# Patient Record
Sex: FEMALE | Race: WHITE | NOT HISPANIC OR LATINO | Employment: FULL TIME | URBAN - NONMETROPOLITAN AREA
[De-identification: names, ages, dates, MRNs, and addresses within clinical notes are randomized per-mention and may not be internally consistent; named-entity substitution may affect disease eponyms.]

---

## 2021-01-18 ENCOUNTER — IMMUNIZATION (OUTPATIENT)
Dept: VACCINE CLINIC | Facility: HOSPITAL | Age: 61
End: 2021-01-18

## 2021-01-18 PROCEDURE — 0001A: CPT | Performed by: FAMILY MEDICINE

## 2021-01-18 PROCEDURE — 91300 HC SARSCOV02 VAC 30MCG/0.3ML IM: CPT | Performed by: FAMILY MEDICINE

## 2021-02-08 ENCOUNTER — IMMUNIZATION (OUTPATIENT)
Dept: VACCINE CLINIC | Facility: HOSPITAL | Age: 61
End: 2021-02-08

## 2021-02-08 PROCEDURE — 0002A: CPT | Performed by: INTERNAL MEDICINE

## 2021-02-08 PROCEDURE — 91300 HC SARSCOV02 VAC 30MCG/0.3ML IM: CPT | Performed by: INTERNAL MEDICINE

## 2021-09-09 ENCOUNTER — OFFICE VISIT (OUTPATIENT)
Dept: NEUROSURGERY | Facility: CLINIC | Age: 61
End: 2021-09-09

## 2021-09-09 VITALS
OXYGEN SATURATION: 99 % | HEIGHT: 60 IN | BODY MASS INDEX: 41.11 KG/M2 | WEIGHT: 209.4 LBS | TEMPERATURE: 97.3 F | HEART RATE: 75 BPM

## 2021-09-09 DIAGNOSIS — M47.22 OSTEOARTHRITIS OF SPINE WITH RADICULOPATHY, CERVICAL REGION: ICD-10-CM

## 2021-09-09 DIAGNOSIS — M50.30 DDD (DEGENERATIVE DISC DISEASE), CERVICAL: ICD-10-CM

## 2021-09-09 DIAGNOSIS — M47.812 CERVICAL SPONDYLOSIS WITHOUT MYELOPATHY: Primary | ICD-10-CM

## 2021-09-09 PROCEDURE — 99204 OFFICE O/P NEW MOD 45 MIN: CPT | Performed by: PHYSICIAN ASSISTANT

## 2021-09-09 RX ORDER — CHOLECALCIFEROL (VITAMIN D3) 1250 MCG
50000 CAPSULE ORAL WEEKLY
COMMUNITY
Start: 2021-09-02

## 2021-09-09 RX ORDER — GABAPENTIN 300 MG/1
300 CAPSULE ORAL 3 TIMES DAILY
Qty: 30 CAPSULE | Refills: 1 | Status: SHIPPED | OUTPATIENT
Start: 2021-09-09 | End: 2021-09-30

## 2021-09-09 RX ORDER — TRAMADOL HYDROCHLORIDE 50 MG/1
50 TABLET ORAL 2 TIMES DAILY PRN
COMMUNITY
Start: 2021-08-25

## 2021-09-09 RX ORDER — MELOXICAM 15 MG/1
15 TABLET ORAL DAILY PRN
COMMUNITY
Start: 2021-09-02

## 2021-09-09 NOTE — PROGRESS NOTES
Patient: Yesica Bronson  : 1960  Chart #: [id number]    Date of Service: 2021    Chief Complaint   Patient presents with   • Neck Pain   • Tingling and pain in LLE, left-sided       HPI  Very pleasant 60 yo, right-handed, WF with 5 weeks of neck pain radiating into the left shoulder down the arm with associated numbness and tingling in the left arm and all of the fingers and thumb of the left hand.  She has no precipitating events, no injuries or falls, the only difference has been a new memory mattress and pillow.  She has found that if she tilts her head to the right that she gets resolution of the numbness and tingling in her hand and arm.  She has not noticed any weakness in her  or arm strength.  She has received steroid shots from her PCP with minimal improvement.  She is on her second anti-inflammatory medication, she has not been to physical therapy.    Chronic Illnesses:  Arthritis  Obesity  Past Medical History:   Diagnosis Date   • Anemia    • Arthritis    • Asthma    • Osteopetrosis    • Parathyroid adenoma    • Parathyroid disease (CMS/[facility])        No Known Allergies      Current Outpatient Medications:   •  Cholecalciferol (Vitamin D3) 1.25 MG (53055 UT) capsule, Take 50,000 Units by mouth 1 (One) Time Per Week., Disp: , Rfl:   •  meloxicam (MOBIC) 15 MG tablet, Take 15 mg by mouth Daily As Needed. for pain, Disp: , Rfl:   •  ProAir  (90 Base) MCG/ACT inhaler, As Needed., Disp: , Rfl:   •  traMADol (ULTRAM) 50 MG tablet, Take 50 mg by mouth 2 (Two) Times a Day As Needed. for pain, Disp: , Rfl:     Social History     Socioeconomic History   • Marital status: Single     Spouse name: Not on file   • Number of children: Not on file   • Years of education: Not on file   • Highest education level: Not on file   Tobacco Use   • Smoking status: Never Smoker   • Smokeless tobacco: Never Used   Vaping Use   • Vaping Use: Never used   Substance and Sexual Activity   • Alcohol use: Never   •  Drug use: Never   • Sexual activity: Defer       Family History   Problem Relation Age of Onset   • Heart disease Mother    • Lupus Sister    • Rheum arthritis Sister    • Arthritis Brother    • Arthritis Sister        Review of Systems   Constitutional: Negative for activity change, appetite change, chills, diaphoresis, fatigue, fever and unexpected weight change.   HENT: Negative for congestion, dental problem, drooling, ear discharge, ear pain, facial swelling, hearing loss, mouth sores, nosebleeds, postnasal drip, rhinorrhea, sinus pressure, sinus pain, sneezing, sore throat, tinnitus, trouble swallowing and voice change.    Eyes: Negative for photophobia, pain, discharge, redness, itching and visual disturbance.   Respiratory: Negative for apnea, cough, choking, chest tightness, shortness of breath, wheezing and stridor.    Cardiovascular: Negative for chest pain, palpitations and leg swelling.   Gastrointestinal: Negative for abdominal distention, abdominal pain, anal bleeding, blood in stool, constipation, diarrhea, nausea, rectal pain and vomiting.   Endocrine: Negative for cold intolerance, heat intolerance, polydipsia, polyphagia and polyuria.   Genitourinary: Negative for decreased urine volume, difficulty urinating, dysuria, enuresis, flank pain, frequency, genital sores, hematuria and urgency.   Musculoskeletal: Positive for back pain, neck pain and neck stiffness. Negative for arthralgias, gait problem, joint swelling and myalgias.   Skin: Negative for color change, pallor, rash and wound.   Allergic/Immunologic: Negative for environmental allergies, food allergies and immunocompromised state.   Neurological: Positive for numbness. Negative for dizziness, tremors, seizures, syncope, facial asymmetry, speech difficulty, weakness, light-headedness and headaches.   Hematological: Negative for adenopathy. Does not bruise/bleed easily.   Psychiatric/Behavioral: Negative for agitation, behavioral problems,  "confusion, decreased concentration, dysphoric mood, hallucinations, self-injury, sleep disturbance and suicidal ideas. The patient is not nervous/anxious and is not hyperactive.    All other systems reviewed and are negative.      Patient's Body mass index is 40.9 kg/m². indicating that she is morbidly obese (BMI > 40 or > 35 with obesity - related health condition). Obesity-related health conditions include the following: osteoarthritis. Obesity is unchanged. BMI is is above average; BMI management plan is completed.     Social History    Tobacco Use      Smoking status: Never Smoker      Smokeless tobacco: Never Used       Physical examination:  Pulse 75, temperature 97.3 °F (36.3 °C), height 152.4 cm (60\"), weight 95 kg (209 lb 6.4 oz), SpO2 99 %.  HEENT- normocephalic, atraumatic, sclera clear  Lungs-normal expansion, no wheezing  Heart-regular rate and rhythm  Extremities-positive pulses, no edema    Neurologic Exam    WDWN WF  A/A/C, speech clear, attention normal, conversant, answers questions appropriately, good historian.  Cranial nerves II through XII are intact  Motor examination does not reveal weakness in the , upper or lower extremities.   Sensation is intact.  Gait is normal, balance is normal.   No tremors are noted.  Reflexes are intact.   Soriano is negative. Clonus is negative.   Palpation of the left shoulder and arm are mildly tender.    Radiographic Imaging:  I have personally reviewed imaging and outside results. I have independently interpreted the imaging and discussed with the patient the findings and appropriate management.  MRI of the cervical spine shows degenerative changes at C3-4 with left foraminal narrowing,  Changes at C6-7 with bilateral foraminal narrowing    Medical Decision Making  Assessment and Plan:  1. Cervical spondylosis C3-4, C5-6, C6-7.  2. Neck and left arm pain.  3. Numbness, tingling     I have recommended PT and low Neurontin in an attempt to improve her neck " and left arm pain. We will have a telemed visit next week to discuss her response to medication. We will follow with her response to PT and make further recommendations for treatment.     Genesis Andino PA-C         Patient Care Team:  Berto Duron MD as PCP - General (General Surgery)

## 2021-09-17 ENCOUNTER — TELEMEDICINE (OUTPATIENT)
Dept: NEUROSURGERY | Facility: CLINIC | Age: 61
End: 2021-09-17

## 2021-09-17 DIAGNOSIS — M50.20 HNP (HERNIATED NUCLEUS PULPOSUS), CERVICAL: Primary | ICD-10-CM

## 2021-09-17 PROCEDURE — 99214 OFFICE O/P EST MOD 30 MIN: CPT | Performed by: PHYSICIAN ASSISTANT

## 2021-09-17 NOTE — PROGRESS NOTES
Yesica Bronson   1960   [id number]     09/17/2021     You have chosen to receive care through a telehealth visit.  Do you consent to use a video/audio connection for your medical care today? Yes    HPI   This is a 61-year-old female with neck pain radiating into the left arm with numbness and tingling into the fingers and thumb on the left.  Prior MRI of the cervical spine showed degenerative changes at C3-4 with left foraminal narrowing and changes at C6-7 with bilateral foraminal narrowing.  The patient describes a burning pain in the arms.  The patient was started on Neurontin at her last visit and she reports that she has had a good response.    Chronic Illnesses:  Osteoarthritis  Past Medical History:  No date: Anemia  No date: Arthritis  No date: Asthma  No date: Osteopetrosis  No date: Parathyroid adenoma  No date: Parathyroid disease (CMS/[facility])     Past Surgical History:   Procedure Laterality Date   • HYSTERECTOMY  2000   • PARATHYROID GLAND SURGERY  2001    Partial removal         No Known Allergies       Current Outpatient Medications:   •  Cholecalciferol (Vitamin D3) 1.25 MG (26078 UT) capsule, Take 50,000 Units by mouth 1 (One) Time Per Week., Disp: , Rfl:   •  gabapentin (NEURONTIN) 300 MG capsule, Take 1 capsule by mouth 3 (Three) Times a Day. One capsule at hs for 3-4 nights then 2 at hs for 3-4 nights., Disp: 30 capsule, Rfl: 1  •  meloxicam (MOBIC) 15 MG tablet, Take 15 mg by mouth Daily As Needed. for pain, Disp: , Rfl:   •  ProAir  (90 Base) MCG/ACT inhaler, As Needed., Disp: , Rfl:   •  traMADol (ULTRAM) 50 MG tablet, Take 50 mg by mouth 2 (Two) Times a Day As Needed. for pain, Disp: , Rfl:      Social History     Socioeconomic History   • Marital status: Single     Spouse name: Not on file   • Number of children: Not on file   • Years of education: Not on file   • Highest education level: Not on file   Tobacco Use   • Smoking status: Never Smoker   • Smokeless tobacco: Never Used    Vaping Use   • Vaping Use: Never used   Substance and Sexual Activity   • Alcohol use: Never   • Drug use: Never   • Sexual activity: Defer        family history includes Arthritis in her brother and sister; Heart disease in her mother; Lupus in her sister; Rheum arthritis in her sister.     Social History    Tobacco Use      Smoking status: Never Smoker      Smokeless tobacco: Never Used       There is no height or weight on file to calculate BMI.   Patient's There is no height or weight on file to calculate BMI. indicating that she is morbidly obese (BMI > 40 or > 35 with obesity - related health condition). Obesity-related health conditions include the following: osteoarthritis. Obesity is unchanged. BMI is is above average; BMI management plan is completed.   There were no vitals taken for this visit.     Radiological Data Review:    Cervical MRI shows multilevel degenerative disc disease worsening spondylosis at C3-4, C5-6 and C6-7 with canal narrowing.    Assessment and Plan:  1 cervical spondylosis  2 neuropathic pain-currently on Neurontin 300 mg, she will try 600 mg.  3 she will attend physical therapy next week and we will have a follow-up telemedicine visit to see how she is doing with medications and therapy.    Genesis Andino, PAC      PCP:  Berto Duron MD

## 2021-09-30 ENCOUNTER — TELEMEDICINE (OUTPATIENT)
Dept: NEUROSURGERY | Facility: CLINIC | Age: 61
End: 2021-09-30

## 2021-09-30 DIAGNOSIS — M50.30 DDD (DEGENERATIVE DISC DISEASE), CERVICAL: ICD-10-CM

## 2021-09-30 DIAGNOSIS — M47.812 CERVICAL SPONDYLOSIS WITHOUT MYELOPATHY: ICD-10-CM

## 2021-09-30 DIAGNOSIS — M47.22 OSTEOARTHRITIS OF SPINE WITH RADICULOPATHY, CERVICAL REGION: Primary | ICD-10-CM

## 2021-09-30 DIAGNOSIS — M50.20 HNP (HERNIATED NUCLEUS PULPOSUS), CERVICAL: ICD-10-CM

## 2021-09-30 PROCEDURE — 99214 OFFICE O/P EST MOD 30 MIN: CPT | Performed by: PHYSICIAN ASSISTANT

## 2021-09-30 RX ORDER — GABAPENTIN 300 MG/1
300 CAPSULE ORAL NIGHTLY
Qty: 60 CAPSULE | Refills: 1 | Status: SHIPPED | OUTPATIENT
Start: 2021-09-30 | End: 2021-10-21 | Stop reason: SDUPTHER

## 2021-09-30 NOTE — PROGRESS NOTES
Patient: Yesica Bronson  : 1960  Chart #: [id number]    Date of Service: 2021    CC: Neck and left shoulder pain    HPI  Very pleasant 62 yo, right-handed, WF with 5 weeks of neck pain radiating into the left shoulder down the arm with associated numbness and tingling in the left arm and all of the fingers and thumb of the left hand.  She has no precipitating events, no injuries or falls, the only difference has been a new memory mattress and pillow.  She has found that if she tilts her head to the right that she gets resolution of the numbness and tingling in her hand and arm.  She has not noticed any weakness in her  or arm strength.  She has received steroid shots from her PCP with minimal improvement.  She is on her second anti-inflammatory medication, she is currently in physical therapy and reports that her symptoms have improved.  She is also on Neurontin 300 mg twice in the evening with improvement in her symptoms.    Chronic Illnesses:  Arthritis  Obesity  Past Medical History:   Diagnosis Date   • Anemia    • Arthritis    • Asthma    • Osteopetrosis    • Parathyroid adenoma    • Parathyroid disease (CMS/[facility])        No Known Allergies      Current Outpatient Medications:   •  Cholecalciferol (Vitamin D3) 1.25 MG (62113 UT) capsule, Take 50,000 Units by mouth 1 (One) Time Per Week., Disp: , Rfl:   •  gabapentin (NEURONTIN) 300 MG capsule, Take 1 capsule by mouth Every Night. Take 2 capsules in the evening, Disp: 60 capsule, Rfl: 1  •  meloxicam (MOBIC) 15 MG tablet, Take 15 mg by mouth Daily As Needed. for pain, Disp: , Rfl:   •  ProAir  (90 Base) MCG/ACT inhaler, As Needed., Disp: , Rfl:   •  traMADol (ULTRAM) 50 MG tablet, Take 50 mg by mouth 2 (Two) Times a Day As Needed. for pain, Disp: , Rfl:     Social History     Socioeconomic History   • Marital status: Single     Spouse name: Not on file   • Number of children: Not on file   • Years of education: Not on file   • Highest  education level: Not on file   Tobacco Use   • Smoking status: Never Smoker   • Smokeless tobacco: Never Used   Vaping Use   • Vaping Use: Never used   Substance and Sexual Activity   • Alcohol use: Never   • Drug use: Never   • Sexual activity: Defer       Family History   Problem Relation Age of Onset   • Heart disease Mother    • Lupus Sister    • Rheum arthritis Sister    • Arthritis Brother    • Arthritis Sister        Review of Systems   Constitutional: Negative for activity change, appetite change, chills, diaphoresis, fatigue, fever and unexpected weight change.   HENT: Negative for congestion, dental problem, drooling, ear discharge, ear pain, facial swelling, hearing loss, mouth sores, nosebleeds, postnasal drip, rhinorrhea, sinus pressure, sinus pain, sneezing, sore throat, tinnitus, trouble swallowing and voice change.    Eyes: Negative for photophobia, pain, discharge, redness, itching and visual disturbance.   Respiratory: Negative for apnea, cough, choking, chest tightness, shortness of breath, wheezing and stridor.    Cardiovascular: Negative for chest pain, palpitations and leg swelling.   Gastrointestinal: Negative for abdominal distention, abdominal pain, anal bleeding, blood in stool, constipation, diarrhea, nausea, rectal pain and vomiting.   Endocrine: Negative for cold intolerance, heat intolerance, polydipsia, polyphagia and polyuria.   Genitourinary: Negative for decreased urine volume, difficulty urinating, dysuria, enuresis, flank pain, frequency, genital sores, hematuria and urgency.   Musculoskeletal: Positive for back pain, neck pain and neck stiffness. Negative for arthralgias, gait problem, joint swelling and myalgias.   Skin: Negative for color change, pallor, rash and wound.   Allergic/Immunologic: Negative for environmental allergies, food allergies and immunocompromised state.   Neurological: Positive for numbness. Negative for dizziness, tremors, seizures, syncope, facial  asymmetry, speech difficulty, weakness, light-headedness and headaches.   Hematological: Negative for adenopathy. Does not bruise/bleed easily.   Psychiatric/Behavioral: Negative for agitation, behavioral problems, confusion, decreased concentration, dysphoric mood, hallucinations, self-injury, sleep disturbance and suicidal ideas. The patient is not nervous/anxious and is not hyperactive.    All other systems reviewed and are negative.      Patient's There is no height or weight on file to calculate BMI. indicating that she is morbidly obese (BMI > 40 or > 35 with obesity - related health condition). Obesity-related health conditions include the following: osteoarthritis. Obesity is unchanged. BMI is is above average; BMI management plan is completed.     Social History    Tobacco Use      Smoking status: Never Smoker      Smokeless tobacco: Never Used       Physical examination:  There were no vitals taken for this visit.  HEENT- normocephalic, atraumatic, sclera clear  Lungs-normal expansion, no wheezing  Heart-regular rate and rhythm  Extremities-positive pulses, no edema    Neurologic Exam    WDWN WF  A/A/C, speech clear, attention normal, conversant, answers questions appropriately, good historian.  Cranial nerves II through XII are intact  Motor examination does not reveal weakness in the , upper or lower extremities.   Sensation is intact.  Gait is normal, balance is normal.   No tremors are noted.  Reflexes are intact.   Soriano is negative. Clonus is negative.   Palpation of the left shoulder and arm are mildly tender.    Radiographic Imaging:  I have personally reviewed imaging and outside results. I have independently interpreted the imaging and discussed with the patient the findings and appropriate management.  MRI of the cervical spine shows degenerative changes at C3-4 with left foraminal narrowing,  Changes at C6-7 with bilateral foraminal narrowing    Medical Decision Making  Assessment and  Plan:  1. Cervical spondylosis C3-4, C5-6, C6-7.  2. Neck and left shoulder pain has improved with physical therapy and medications.  She would like to continue conservative treatment with physical therapy medications and time prior to any surgical intervention.  3. Numbness, tingling has improved with Neurontin  4.  Telemedicine visit in 2 weeks, she will call me if her symptoms change.      Genesis Anidno PA-C       Patient Care Team:  Berto Duron MD as PCP - General (General Surgery)  Marco Mora as Referring Physician (Nurse Practitioner)

## 2021-10-20 DIAGNOSIS — M50.20 HNP (HERNIATED NUCLEUS PULPOSUS), CERVICAL: ICD-10-CM

## 2021-10-20 DIAGNOSIS — M47.22 OSTEOARTHRITIS OF SPINE WITH RADICULOPATHY, CERVICAL REGION: ICD-10-CM

## 2021-10-20 DIAGNOSIS — M47.812 CERVICAL SPONDYLOSIS WITHOUT MYELOPATHY: Primary | ICD-10-CM

## 2021-10-20 DIAGNOSIS — M50.30 DDD (DEGENERATIVE DISC DISEASE), CERVICAL: ICD-10-CM

## 2021-10-20 NOTE — TELEPHONE ENCOUNTER
Pallavi could you put in the order and I will sign for her Neurontin and then, Erin can you cancel her telemedicine visit.

## 2021-10-20 NOTE — TELEPHONE ENCOUNTER
"Provider:  Sina  Caller: Patient  Time of call:   4:27  Phone #:  [phone]  Surgery:  NA  Surgery Date: NA   Last visit:   09/09/2021  Next visit: TBD        Reason for call:         Patient is inquiring about telephone visit and Gabapentin. Please see encounter from Trish.     Last OV: 09/09/2021  Last video visit: 09/30/2021  Telemedicine with Genesis Andino PA-C (09/30/2021)    Medical Decision Making  Assessment and Plan:  1. Cervical spondylosis C3-4, C5-6, C6-7.  2. Neck and left shoulder pain has improved with physical therapy and medications.  She would like to continue conservative treatment with physical therapy medications and time prior to any surgical intervention.  3. Numbness, tingling has improved with Neurontin  4.  Telemedicine visit in 2 weeks, she will call me if her symptoms change.\"\"  "

## 2021-10-20 NOTE — TELEPHONE ENCOUNTER
THIS PT HAD VIDEOVISIT ON 9/30 AND STATES AT TIME VIR[place]IA TOLD HER TO INCREASE HER NEURONTIN PER DAY. PATIENT STATES SHE IS FEELING MUCH BETTER NOW BUT WILL NEED A REFILL VERY SOON. PER 9/30 NOTE, VIRGINIA SAID VIDEOVISIT IN 2WKS. PATIENT WONDERS IF THIS VISIT IS NECESSARY SINCE SHE FEELS GOOD.

## 2021-10-21 NOTE — TELEPHONE ENCOUNTER
Fabi,  neurontin is pended, but unsure what you want to increase her dose to.  Please complete rx order before signing.

## 2021-10-22 RX ORDER — GABAPENTIN 300 MG/1
300 CAPSULE ORAL 3 TIMES DAILY
Qty: 90 CAPSULE | Refills: 1 | Status: SHIPPED | OUTPATIENT
Start: 2021-10-22 | End: 2022-02-22 | Stop reason: SDUPTHER

## 2021-10-22 NOTE — TELEPHONE ENCOUNTER
I have spoke with the patient, she is taking 1 in the morning and 2 in the evening.  I am calling in a prescription for 1, 3 times a day, I have discussed with the patient and she knows how to take 3 times a day

## 2021-10-25 NOTE — TELEPHONE ENCOUNTER
MS MARLEY HAS CALLED THIS MORNING STATING THAT ASA COULD NOT FIGURE  OUT IF THIS REFILL WAS FOR 3 PER DAY OR 5 PER DAY. PLEASE ADVISE.

## 2021-11-08 ENCOUNTER — TELEPHONE (OUTPATIENT)
Dept: NEUROSURGERY | Facility: CLINIC | Age: 61
End: 2021-11-08

## 2021-11-08 NOTE — TELEPHONE ENCOUNTER
Tingling in right arm and hand. She is attributing that to extra activities with PT.   She is working on adjusting her laptop.   She went back to PT and feels better tonight. She continues with some in the left arm that she is working with in PT.  She plan to continue with PT thru the end of the month.    Genesis Andino PA-C

## 2021-11-08 NOTE — TELEPHONE ENCOUNTER
Caller:  ORIANA MARLEY    Relationship: SELF    Best call back number: [phone]    What was the call regarding: PT CALLED AND STATED SHE NEEDED TO SPEAK WITH VIR[place]IA GARIBAY.  PT STATS SHE IS EXPERIENCING NEW SYMPTOMS.  PT STATES SHE HAS STARTED TO EXPERIENCE NUMBNESS AND TINGLING IN RIGHT ARM.  PT STATES THESE SYMPTOMS STARTED ON Friday NIGHT AFTER SHE HAD COMPLETED PHYSICAL THERAPY.  PT DENIES LOSS OF BOWEL OR BLADDER.  PT STATES THE SYMPTOMS ARE INTERMITTENT.  PT STATES RIGHT ARM ISSUES ARE NEW SHE WAS ALREADY EXPERIENCING THEM IN LEFT ARM.  PT HAS THERAPY SCHEDULED FOR TODAY BUT WOULD LIKE TO KNOW IF SHE SHOULD TAKE A BREAK    Do you require a callback:     PLEASE CALL PT  THANK YOU

## 2021-11-08 NOTE — TELEPHONE ENCOUNTER
Provider:  Sina  Surgery:  SHALOM  Surgery Date:    Last visit:   Office Visit with Genesis Andino PA-C (09/09/2021)    Next visit: NA    Reason for call:     Pt states PT has created new symptoms in her RUE (problem was only in LUE prior to PT).

## 2022-02-22 DIAGNOSIS — M47.22 OSTEOARTHRITIS OF SPINE WITH RADICULOPATHY, CERVICAL REGION: ICD-10-CM

## 2022-02-22 DIAGNOSIS — M47.812 CERVICAL SPONDYLOSIS WITHOUT MYELOPATHY: ICD-10-CM

## 2022-02-22 DIAGNOSIS — M50.20 HNP (HERNIATED NUCLEUS PULPOSUS), CERVICAL: Primary | ICD-10-CM

## 2022-02-22 DIAGNOSIS — M50.30 DDD (DEGENERATIVE DISC DISEASE), CERVICAL: ICD-10-CM

## 2022-02-22 RX ORDER — GABAPENTIN 300 MG/1
300 CAPSULE ORAL 3 TIMES DAILY
Qty: 90 CAPSULE | Refills: 1 | Status: SHIPPED | OUTPATIENT
Start: 2022-02-22 | End: 2022-08-23 | Stop reason: SDUPTHER

## 2022-02-22 NOTE — TELEPHONE ENCOUNTER
Provider:  Fabi FISH  Caller: patieint  Time of call:   11:03  Phone #:  [phone]  Surgery:    Surgery Date:    Last visit:   09/09/21  Next visit:     RISHABH:     11/20/2021 Gabapentin 300MG 1960 60 20 Genesis Andino [facility] 1  11/20/2021 Tramadol Hcl  50MG/50MG/50MG/50MG/50MG/  1960 60 30 [place], [facility] 10 1  12/12/2021 Gabapentin 300MG 1960 90 30 Genesis Andino [facility] 1  12/18/2021 Tramadol Hcl  50MG/50MG/50MG/50MG/50MG/  1960 60 30 [place] [facility] 10 1  01/15/2022 Gabapentin 300MG 1960 30 10 Genesis Andino [facility] 1  01/19/2022 Tramadol Hcl  50MG/50MG/50MG/50MG/50MG/  1960 [age] [place] [facility] 10 1    Reason for call:     Patient called to request refill on Gabapentin.

## 2022-08-23 DIAGNOSIS — M47.812 CERVICAL SPONDYLOSIS WITHOUT MYELOPATHY: ICD-10-CM

## 2022-08-23 DIAGNOSIS — M50.30 DDD (DEGENERATIVE DISC DISEASE), CERVICAL: ICD-10-CM

## 2022-08-23 DIAGNOSIS — M50.20 HNP (HERNIATED NUCLEUS PULPOSUS), CERVICAL: ICD-10-CM

## 2022-08-23 DIAGNOSIS — M47.22 OSTEOARTHRITIS OF SPINE WITH RADICULOPATHY, CERVICAL REGION: ICD-10-CM

## 2022-08-23 RX ORDER — GABAPENTIN 300 MG/1
300 CAPSULE ORAL 3 TIMES DAILY
Qty: 90 CAPSULE | Refills: 1 | Status: SHIPPED | OUTPATIENT
Start: 2022-08-23 | End: 2022-12-29 | Stop reason: SDUPTHER

## 2022-08-23 NOTE — TELEPHONE ENCOUNTER
Provider:  Fabi Andino  Surgery/Procedure:  ---  Surgery/Procedure Date: ---   Last visit:   Telemedicine with Genesis Andino PA-C (09/30/2021)    Next visit: ---     Reason for call:     Patient called in to the clinical line requesting a refill on her Gabapentin.     Last refilled on 4/18/22. She only takes her prescription as needed. Whether its one capsule per day, two per day, or three per day. Just depending on her symptoms.     She states she only has 3 left and would like the refill because it helps her when she 'over does it'    I did advise patient she may need an updated FU appointment, patient was understanding and would like a televisit if it is needed.     Pended med for review, confirmed pharmacy. [facility].     02/22/2022 Gabapentin 300MG 1960 90 18 Genesis Andino [facility] 1  03/20/2022 Tramadol Hcl 50MG 1960 60 30 [facility] 10 1  04/18/2022 Gabapentin 300MG 1960 90 18 Genesis Andino [facility] 1  04/24/2022 Tramadol Hcl 50MG 1960 60 30 [place], [facility] 10 1  05/26/2022 Tramadol Hcl 50MG 1960 60 30 [facility] 10 1  06/24/2022 Tramadol Hcl 50MG 1960 60 30 [place], [facility] 10 1  07/26/2022 Tramadol Hcl 50MG 1960 60 30 [place], [facility] 10 1    Requested Prescriptions     Pending Prescriptions Disp Refills   • gabapentin (NEURONTIN) 300 MG capsule 90 capsule 1     Sig: Take 1 capsule by mouth 3 (Three) Times a Day. Take 2 capsules in the evening

## 2022-09-14 ENCOUNTER — TELEMEDICINE (OUTPATIENT)
Dept: NEUROSURGERY | Facility: CLINIC | Age: 62
End: 2022-09-14

## 2022-09-14 DIAGNOSIS — M47.812 CERVICAL SPONDYLOSIS WITHOUT MYELOPATHY: Primary | ICD-10-CM

## 2022-09-14 PROCEDURE — 99443 PR PHYS/QHP TELEPHONE EVALUATION 21-30 MIN: CPT | Performed by: PHYSICIAN ASSISTANT

## 2022-09-14 NOTE — PROGRESS NOTES
Yesica Bronson   1960   [id number]     09/14/2022     Telemedicine: OZIEL Cervantes  Location of Provider: Office  Type of Service: consult via telemedicine  Any physical exam was assisted by the patient.  All communications with the patient (verbal, audiovisual and written) were documented in the patient's medical record per documentation standards.  Mode of transmission: Telemedicine via 2-way interactive A/V telecommunication.  Basis for telemedicine: COVID-19    You have chosen to receive care through a telephone visit. Do you consent to use a telephone visit for your medical care today? yes    CC:left arm pain with tingling into the 2,3,4th digits.    HPI:  62 yo, right-handed, WF with 5 weeks of neck pain radiating into the left shoulder down the arm with associated numbness and tingling in the left arm and all of the fingers and thumb of the left hand.  She has no precipitating events, no injuries or falls, the only difference has been a new memory mattress and pillow.  She has found that if she tilts her head to the right that she gets resolution of the numbness and tingling in her hand and arm.  She has not noticed any weakness in her  or arm strength.  She has received steroid shots from her PCP with minimal improvement.  She is on her second anti-inflammatory medication, she is currently in physical therapy and reports that her symptoms have improved.  She is also on Neurontin 300 mg twice in the evening with improvement in her symptoms.    Past medical history, allergies, medications, surgical history, social history, family history reviewed and updated.    Social History     Tobacco Use   Smoking Status Never Smoker   Smokeless Tobacco Never Used        There is no height or weight on file to calculate BMI.       Physical examination:  The patient sounds well on the phone, no cough, SOB or wheezing is noted.    Review of studies were completed.      Assessment/Plan:  Diagnoses and all orders for  this visit:    1. Cervical spondylosis without myelopathy (Primary)  -     EMG left arm; Future  -     Nerve Conduction Test Left Arm; Future    With ongoing symptoms we have discussed proceeding with nerve studies and she is in agreement. We will f/u to review.     This was an audio and video enabled telemedicine encounter. This visit has been rescheduled as a phone visit to comply with patient safety concerns in accordance with CDC recommendations. Total time of discussion was 15 minutes, total time of visit 30 minutes.       OZIEL Baig Jerry L, MD

## 2022-09-15 ENCOUNTER — TELEPHONE (OUTPATIENT)
Dept: NEUROSURGERY | Facility: CLINIC | Age: 62
End: 2022-09-15

## 2022-09-15 DIAGNOSIS — M47.812 CERVICAL SPONDYLOSIS WITHOUT MYELOPATHY: Primary | ICD-10-CM

## 2022-09-15 NOTE — TELEPHONE ENCOUNTER
Provider:  Sina  Surgery/Procedure:  SHALOM  Surgery/Procedure Date: NA   Last visit:   Telemedicine with Genesis Andino PA-C (09/14/2022)  Next visit: NA     Reason for call:     ----- Message from La Nena Ott sent at 9/15/2022 11:05 AM EDT -----  Please correct the order for the EMG/ NCV to only one referral.  Thank you .     I have pended a EMG/NCV test for the left arm. I think the original order needs to be canceled to schedule this one.

## 2022-09-16 NOTE — TELEPHONE ENCOUNTER
I have faxed referral to Dr. Parker's office.  Patient is aware and will let us know when she is scheduled

## 2022-10-25 ENCOUNTER — TELEPHONE (OUTPATIENT)
Dept: NEUROSURGERY | Facility: CLINIC | Age: 62
End: 2022-10-25

## 2022-10-25 NOTE — TELEPHONE ENCOUNTER
Caller: Yesica Bronson    Relationship to patient: Self    Best call back number: [phone]    Chief complaint: TELE-VISIT    Type of visit: FOLLOW UP     Requested date: 11/01    If rescheduling, when is the original appointment: N/A    Additional notes:PATIENT CALLED AND WOULD LIKE TO SWITCH OFFICE VISIT FOR TELE-VISIT INSTEAD WITH VIRGINIA GARIBAY.  PLEASE CALL PATIENT AND CONFIRM. THANK YOU!

## 2022-10-25 NOTE — TELEPHONE ENCOUNTER
Head, normocephalic, atraumatic, Face, Face within normal limits, Ears, External ears within normal limits Spoke to patient. Appointment changed to MyChart video visit (insurance does not cover televisit).

## 2022-11-01 ENCOUNTER — TELEMEDICINE (OUTPATIENT)
Dept: NEUROSURGERY | Facility: CLINIC | Age: 62
End: 2022-11-01

## 2022-11-01 DIAGNOSIS — M47.22 OSTEOARTHRITIS OF SPINE WITH RADICULOPATHY, CERVICAL REGION: ICD-10-CM

## 2022-11-01 DIAGNOSIS — M50.20 HNP (HERNIATED NUCLEUS PULPOSUS), CERVICAL: ICD-10-CM

## 2022-11-01 DIAGNOSIS — M47.812 CERVICAL SPONDYLOSIS WITHOUT MYELOPATHY: ICD-10-CM

## 2022-11-01 DIAGNOSIS — M50.30 DDD (DEGENERATIVE DISC DISEASE), CERVICAL: Primary | ICD-10-CM

## 2022-11-01 DIAGNOSIS — M50.123 CERVICAL DISC DISORDER AT C6-C7 LEVEL WITH RADICULOPATHY: ICD-10-CM

## 2022-11-01 PROCEDURE — 99214 OFFICE O/P EST MOD 30 MIN: CPT | Performed by: PHYSICIAN ASSISTANT

## 2022-11-01 NOTE — PROGRESS NOTES
Yesica SORENSEN Bronson   1960   [id number]     11/01/2022     Telemedicine: OZIEL Cervantes  Location of Provider: Office  Type of Service: consult via telemedicine  Any physical exam was assisted by the patient.  All communications with the patient (verbal, audiovisual and written) were documented in the patient's medical record per documentation standards.  Mode of transmission: Telemedicine via 2-way interactive A/V telecommunication.  Basis for telemedicine: COVID-19    You have chosen to receive care through a telemedicine visit. Do you consent to use a video visit for your medical care today? yes    CC: Neck and arm pain    HPI:  This is a 62-year-old female with neck and arm pain with diagnostic studies That have revealed degenerative changes at C3-4, C5-6 and C6-7 with bilateral foraminal narrowing.  We are having a video visit today to review her EMG and nerve conduction studies.    Past medical history, allergies, medications, surgical history, social history, family history reviewed and updated.    Social History     Tobacco Use   Smoking Status Never   Smokeless Tobacco Never        There is no height or weight on file to calculate BMI.       Physical examination:  The patient sounds well on the phone, no cough, SOB or wheezing is noted.    Review of new studies:  Nerve studies reveal a chronic C7 radiculopathy.    Assessment/Plan:  Diagnoses and all orders for this visit:    1. DDD (degenerative disc disease), cervical (Primary)    2. Cervical spondylosis without myelopathy    3. Osteoarthritis of spine with radiculopathy, cervical region    4. HNP (herniated nucleus pulposus), cervical    5. Cervical disc disorder at C6-C7 level with radiculopathy    We lost video connection and completed her visit with a telephone visit.  We have discussed her use of medication which is tramadol which does provide her significant improvement in her symptoms.  Her primary care will only give her a twice daily dose and she  has taken 3 and sometimes 4/day for pain control.  She does not have weakness in her  or arm, with her last exacerbation she had significant pain and now with improved pain and she does have residual tingling.  Our plan for now will be to continue with her therapy, continue tramadol for pain control and have a follow-up appointment with Dr. Blanchard in the [facility] to further discuss best treatment plans.      This was an audio and video enabled telemedicine encounter. This visit has been rescheduled as a phone visit to comply with patient safety concerns in accordance with CDC recommendations.     Total time of discussion was 30 minutes.       OZIEL Baig Jerry L, MD

## 2022-11-04 ENCOUNTER — TELEPHONE (OUTPATIENT)
Dept: NEUROSURGERY | Facility: CLINIC | Age: 62
End: 2022-11-04

## 2022-11-04 NOTE — TELEPHONE ENCOUNTER
----- Message from Genesis Andino PA-C sent at 11/1/2022  4:34 PM EDT -----  Would you make her appointment to see Dr. Blanchard in [place] in the next 6 to 8 weeks.  Thank you

## 2022-12-28 DIAGNOSIS — M47.812 CERVICAL SPONDYLOSIS WITHOUT MYELOPATHY: ICD-10-CM

## 2022-12-28 DIAGNOSIS — M47.22 OSTEOARTHRITIS OF SPINE WITH RADICULOPATHY, CERVICAL REGION: ICD-10-CM

## 2022-12-28 DIAGNOSIS — M50.20 HNP (HERNIATED NUCLEUS PULPOSUS), CERVICAL: ICD-10-CM

## 2022-12-28 DIAGNOSIS — M50.30 DDD (DEGENERATIVE DISC DISEASE), CERVICAL: ICD-10-CM

## 2022-12-29 DIAGNOSIS — M50.20 HNP (HERNIATED NUCLEUS PULPOSUS), CERVICAL: ICD-10-CM

## 2022-12-29 DIAGNOSIS — M47.812 CERVICAL SPONDYLOSIS WITHOUT MYELOPATHY: ICD-10-CM

## 2022-12-29 DIAGNOSIS — M47.22 OSTEOARTHRITIS OF SPINE WITH RADICULOPATHY, CERVICAL REGION: ICD-10-CM

## 2022-12-29 DIAGNOSIS — M50.30 DDD (DEGENERATIVE DISC DISEASE), CERVICAL: ICD-10-CM

## 2022-12-29 RX ORDER — GABAPENTIN 300 MG/1
300 CAPSULE ORAL 3 TIMES DAILY
Qty: 90 CAPSULE | Refills: 1 | Status: SHIPPED | OUTPATIENT
Start: 2022-12-29 | End: 2023-02-21 | Stop reason: SDUPTHER

## 2022-12-29 RX ORDER — GABAPENTIN 300 MG/1
CAPSULE ORAL
Qty: 90 CAPSULE | OUTPATIENT
Start: 2022-12-29

## 2022-12-29 NOTE — TELEPHONE ENCOUNTER
Provider:  Santo/Sina  Caller:  Automated refill request   Surgery:  NA  Surgery Date: NA   Last visit:  Telemedicine with Genesis Andino PA-C (11/01/2022)  Next visit: 01/19/2023-Dr. Blanchard          Reason for call:         Automated refill request for Gabapentin. Medication pending.     Requested Prescriptions     Pending Prescriptions Disp Refills   • gabapentin (NEURONTIN) 300 MG capsule [Pharmacy Med Name: GABAPENTIN 300MG CAPSULES] 90 capsule      Sig: TAKE 1 CAPSULE BY MOUTH IN THE MORNING THEN TAKE 2 CAPSULE BY MOUTH IN THE EVENING     RISHABH:    10/25/2022 Tramadol Hcl 50MG 1960 60 30 [facility] 10 1  11/23/2022 Gabapentin 300MG 1960 90 30 [facility] 1  11/26/2022 Tramadol Hcl 50MG 1960 60 30 [facility] 10 1

## 2023-02-21 DIAGNOSIS — M50.30 DDD (DEGENERATIVE DISC DISEASE), CERVICAL: ICD-10-CM

## 2023-02-21 DIAGNOSIS — M50.20 HNP (HERNIATED NUCLEUS PULPOSUS), CERVICAL: ICD-10-CM

## 2023-02-21 DIAGNOSIS — M47.22 OSTEOARTHRITIS OF SPINE WITH RADICULOPATHY, CERVICAL REGION: ICD-10-CM

## 2023-02-21 DIAGNOSIS — M47.812 CERVICAL SPONDYLOSIS WITHOUT MYELOPATHY: ICD-10-CM

## 2023-02-21 RX ORDER — GABAPENTIN 300 MG/1
300 CAPSULE ORAL 3 TIMES DAILY
Qty: 90 CAPSULE | Refills: 1 | Status: SHIPPED | OUTPATIENT
Start: 2023-02-21

## 2023-02-21 NOTE — TELEPHONE ENCOUNTER
Provider:  Santo/Sina  Surgery/Procedure:  SHALOM  Surgery/Procedure Date:    Last visit:   11/1/22  Next visit: 3/30/23     Reason for call: Refill request for Gabapentin. Last seen by Fabi Andino PA-C on 11/1/22, following up with Dr. Blanchard on 3/30/23 for treatment plan - C6-7 radiculopathy.     Gareth:  09/25/2022 Tramadol Hcl 50MG 1960 60 30 Mora [facility] [place] 10 1  10/25/2022 Tramadol Hcl 50MG 1960 60 30 [facility] [place] 10 1  11/23/2022 Gabapentin 300MG 1960 90 30 [facility] [place] 1  11/26/2022 Tramadol Hcl 50MG 1960 60 30 [facility] [place] 10 1  12/28/2022 Tramadol Hcl 50MG 1960 60 30 Mora [facility] [place] 10 1  01/25/2023 Tramadol Hcl 50MG 1960 60 30 [facility] [place] 10 1

## 2023-03-30 ENCOUNTER — OFFICE VISIT (OUTPATIENT)
Dept: NEUROSURGERY | Facility: CLINIC | Age: 63
End: 2023-03-30
Payer: COMMERCIAL

## 2023-03-30 VITALS
TEMPERATURE: 97.9 F | BODY MASS INDEX: 38.89 KG/M2 | DIASTOLIC BLOOD PRESSURE: 80 MMHG | SYSTOLIC BLOOD PRESSURE: 139 MMHG | HEIGHT: 61 IN | OXYGEN SATURATION: 99 % | HEART RATE: 74 BPM | WEIGHT: 206 LBS

## 2023-03-30 DIAGNOSIS — M47.812 CERVICAL SPONDYLOSIS WITHOUT MYELOPATHY: Primary | ICD-10-CM

## 2023-03-30 NOTE — PROGRESS NOTES
"NEUROSURGERY PROGRESS NOTE    Patient: Yesica Bronson  : 1960    Primary Care Provider: Berto Duron MD    Chief Complaint: Neck and left arm pain    Subjective: This is a 63-year-old female who has been followed by our office for neck and left arm pain.  She has pain that starts in her neck she also gets pain that radiates down the left arm to the second and third digit.  She is undergone an EMG which shows both left carpal tunnel as well as C7 radiculopathy.  She notes that her symptoms are improved with time.  She has numbness in the fingers but not significant of pain.  She denies any hand dysfunction or hand weakness.  Of note, the patient has had multiple neck operations as well as a left wrist surgery due to a fracture.  She does find relief in her symptoms with gabapentin    Objective    Vital Signs: Blood pressure 139/80, pulse 74, temperature 97.9 °F (36.6 °C), temperature source Temporal, height 154.9 cm (61\"), weight 93.4 kg (206 lb), SpO2 99 %.    Physical Exam  Awake, alert and oriented x 3  Opens eyes spont  Pupils 3 mm rx bilat  Extraocular muscles intact bilaterally  Face symmetric bilaterally  Tongue midline  5/5 in all 4 ext with exception of left handgrip which is 4+  No Soriano's      Current Medications:   Current Outpatient Medications:   •  Cholecalciferol (Vitamin D3) 1.25 MG (19821 UT) capsule, Take 1 capsule by mouth 1 (One) Time Per Week., Disp: , Rfl:   •  gabapentin (NEURONTIN) 300 MG capsule, Take 1 capsule by mouth 3 (Three) Times a Day. Take 2 capsules in the evening, Disp: 90 capsule, Rfl: 1  •  meloxicam (MOBIC) 15 MG tablet, Take 1 tablet by mouth Daily As Needed. for pain, Disp: , Rfl:   •  ProAir  (90 Base) MCG/ACT inhaler, As Needed., Disp: , Rfl:   •  traMADol (ULTRAM) 50 MG tablet, Take 1 tablet by mouth 2 (Two) Times a Day As Needed. for pain, Disp: , Rfl:      Laboratory Results:                              Brief Urine Lab Results     None    "     Microbiology Results (last 10 days)     ** No results found for the last 240 hours. **          Diagnostic Imaging: I reviewed and independently interpreted the new imaging.     Assessment/Plan:  This is a 63-year-old female presenting with chronic neck and left arm pain.  The patient's EMG shows a left C7 radiculopathy as well as a carpal tunnel syndrome.  In reviewing the patient's cervical MRI, she has moderate degenerative changes most notably at C6-7 where there is compression of the exiting nerve root on the left.  The patient's symptoms have improved with time and gabapentin.  I explained to the patient she is at a higher risk for an ACDF given her multiple neck operations.  Additionally, the patient states she is not interested in having surgery at this time.  I am going to refer her to pain management to discuss injections.  I told the patient if she decides she like to proceed with surgery, she should give our office a call and we will reevaluate her.  It is likely I will try ENT assistance for her neck exposure in the event we proceed.    Diagnoses and all orders for this visit:    1. Cervical spondylosis without myelopathy (Primary)        Javi Blanchard MD  03/30/23  09:20 EDT

## 2023-09-21 DIAGNOSIS — M50.20 HNP (HERNIATED NUCLEUS PULPOSUS), CERVICAL: ICD-10-CM

## 2023-09-21 DIAGNOSIS — M47.22 OSTEOARTHRITIS OF SPINE WITH RADICULOPATHY, CERVICAL REGION: ICD-10-CM

## 2023-09-21 DIAGNOSIS — M50.30 DDD (DEGENERATIVE DISC DISEASE), CERVICAL: ICD-10-CM

## 2023-09-21 DIAGNOSIS — M47.812 CERVICAL SPONDYLOSIS WITHOUT MYELOPATHY: ICD-10-CM

## 2023-09-21 RX ORDER — GABAPENTIN 300 MG/1
300 CAPSULE ORAL 3 TIMES DAILY
Qty: 90 CAPSULE | Refills: 1 | Status: SHIPPED | OUTPATIENT
Start: 2023-09-21

## 2023-09-21 NOTE — TELEPHONE ENCOUNTER
Caller: Yesica Bronson    Relationship: Self    Best call back number: [phone]    Requested Prescriptions:   Requested Prescriptions     Pending Prescriptions Disp Refills    gabapentin (NEURONTIN) 300 MG capsule 90 capsule 1     Sig: Take 1 capsule by mouth 3 (Three) Times a Day. Take 2 capsules in the evening        Pharmacy where request should be sent:  [facility]    Last office visit with prescribing clinician: 3/30/2023   Last telemedicine visit with prescribing clinician: Visit date not found   Next office visit with prescribing clinician: Visit date not found     Additional details provided by patient: NONE LEFT, STATES SHE LEFT A VM A FEW DAYS AGO BUT NOTHING IN CHART.     Does the patient have less than a 3 day supply:  [x] Yes  [] No    Would you like a call back once the refill request has been completed: [x] Yes [] No    If the office needs to give you a call back, can they leave a voicemail: [x] Yes [] No    Carlos Joy Rep   09/21/23 13:34 EDT

## 2023-09-21 NOTE — TELEPHONE ENCOUNTER
Provider: Santo   Caller: Yesica Bronson    Surgery: -   Surgery Date: -   Last visit:  Office Visit with Javi Blanchard MD (03/30/2023)   Next visit: No follows up on file   Last filled: 5/26/23       Reason for call:         Requested Prescriptions     Pending Prescriptions Disp Refills    gabapentin (NEURONTIN) 300 MG capsule 90 capsule 1     Sig: Take 1 capsule by mouth 3 (Three) Times a Day. Take 2 capsules in the evening    RISHABH:   Pat ID Date Filled RX # Drug Name Prescriber Name Dispenser Name Qty Days MED PMT Rpt To  1 08/26/2023 [id number] Tramadol Hcl 50MG Mora, Marco [facility] [employer] 60 30 10 04 [place]  Date Written New/Refill Dosage Form Prescriber [facility]  [facility]  Pat ID Date Filled RX # Drug Name Prescriber Name Dispenser Name Qty Days MED PMT Rpt To  1 07/24/2023 [id number] Tramadol Hcl 50MG Mora, Marco [employer] [employer] 60 30 10 04 [place]  Date Written New/Refill Dosage Form Prescriber [facility]  07/24/2023 New [facility]  Pat ID Date Filled RX # Drug Name Prescriber Name Dispenser Name Qty Days MED PMT Rpt To  1 06/24/2023 [id number] Tramadol Hcl 50MG Mora, Marco [facility] [employer] 60 30 10 04 [place]  Date Written New/Refill Dosage Form Prescriber [facility]  04/26/2023 Refill [facility]  Pat ID Date Filled RX # Drug Name Prescriber Name Dispenser Name Qty Days MED PMT Rpt To  1 05/26/2023 [id number] Gabapentin 300MG Vicente Osborne [employer] 90 30 04 [place]  Date Written New/Refill Dosage Form Prescriber [facility]  02/21/2023 Refill [facility]  Pat ID Date Filled RX # Drug Name Prescriber Name Dispenser Name Qty Days MED PMT Rpt To  1 05/26/2023 [id number] Tramadol Hcl 50MG Mora, Marco [facility] 60 30 10 04 [place]  Date Written New/Refill Dosage Form Prescriber [facility]  04/26/2023 New [facility]  Pat ID Date Filled RX # Drug Name Prescriber  Name Dispenser Name Qty Days MED PMT Rpt To  1 04/26/2023 [id number] Tramadol Hcl 50MG Mora, Marco [facility] 60 30 10 04 [place]  Date Written New/Refill Dosage Form Prescriber [facility]  04/26/2023 New [facility]  Pat ID Date Filled RX # Drug Name Prescriber Name Dispenser Name Qty Days MED PMT Rpt To  1 03/29/2023 [id number] Gabapentin 300MG Mora, Marco [facility] 60 30 04 [place]  Date Written New/Refill Dosage Form Prescriber [facility]  01/25/2023 [facility]  Pat ID Date Filled RX # Drug Name Prescriber Name Dispenser Name Qty Days MED PMT Rpt To  1 03/29/2023 [id number] Tramadol Hcl 50MG Mora, Marco [employer] 60 30 10 04 [place]  Date Written New/Refill Dosage Form Prescriber [facility]  01/25/2023 Refill [facility]  Pat ID Date Filled RX # Drug Name Prescriber Name Dispenser Name Qty Days MED PMT Rpt To  1 02/26/2023 [id number] Tramadol Hcl 50MG Mora, Marco [facility] 60 30 10 04 [place]  Date Written New/Refill Dosage Form Prescriber [facility]  01/25/2023 Refill [facility]  Pat ID Date Filled RX # Drug Name Prescriber Name Dispenser Name Qty Days MED PMT Rpt To  1 02/21/2023 [id number] Gabapentin 300MG [facility] 30 04 [place]  Date Written New/Refill Dosage Form Prescriber [facility]  02/21/2023 [facility]  Pat ID Date Filled RX # Drug Name Prescriber Name Dispenser Name Qty Days MED PMT Rpt To  1 01/25/2023 [id number] Tramadol Hcl 50MG Mora, Marco [facility] 60 30 10 04 [place]  Date Written New/Refill Dosage Form Prescriber [facility]  01/25/2023 [facility]  Pat ID Date Filled RX # Drug Name Prescriber Name Dispenser Name Qty Days MED PMT Rpt To  1 12/28/2022 [id number] Tramadol Hcl 50MG Marco [facility] 60 30 10 04 [place]  Date Written New/Refill Dosage Form Prescriber [facility] Dispens  [facility]  10/25/2022 Refill [facility]  Pat ID Date Filled RX # Drug Name Prescriber Name Dispenser Name Qty Days MED PMT Rpt To  1 11/26/2022 [id number] Tramadol Hcl 50MG Marco [facility] [employer] [facility] [facility] 30 10 04 [place]  Date Written New/Refill Dosage Form Prescriber [facility] Dispenser [facility]  10/25/2022 Refill [facility]  1 11/23/2022 [id number] Gabapentin 300MG Melissa Baeza [facility] 90 30 04 [place]  08/23/2022 [facility]

## 2023-12-19 DIAGNOSIS — M50.20 HNP (HERNIATED NUCLEUS PULPOSUS), CERVICAL: ICD-10-CM

## 2023-12-19 DIAGNOSIS — M50.30 DDD (DEGENERATIVE DISC DISEASE), CERVICAL: ICD-10-CM

## 2023-12-19 DIAGNOSIS — M47.812 CERVICAL SPONDYLOSIS WITHOUT MYELOPATHY: ICD-10-CM

## 2023-12-19 DIAGNOSIS — M47.22 OSTEOARTHRITIS OF SPINE WITH RADICULOPATHY, CERVICAL REGION: ICD-10-CM

## 2023-12-19 RX ORDER — GABAPENTIN 300 MG/1
300 CAPSULE ORAL 3 TIMES DAILY
Qty: 90 CAPSULE | Refills: 1 | Status: SHIPPED | OUTPATIENT
Start: 2023-12-19

## 2023-12-19 NOTE — TELEPHONE ENCOUNTER
Provider:  Santo   Surgery/Procedure:  SHALOM  Surgery/Procedure Date:    Last visit:   3/30/23  Next visit: NA     Reason for call:  Refill request for sascha.     Gareth:    Pat ID Date Filled RX # Drug Name Prescriber Name Dispenser Name Qty Days MED PMT Rpt To  1 11/29/2023 [id number] Tramadol Hcl 50MG Mora, Marco [employer] 60 30 10 04 [place]  Date Written New/Refill Dosage Form Prescriber [facility]  [facility]  Pat ID Date Filled RX # Drug Name Prescriber Name Dispenser Name Qty Days MED PMT Rpt To  1 10/29/2023 [id number] Gabapentin 300MG Croucher, Genesis [employer] 90 30 04 [place]  Date Written New/Refill Dosage Form Prescriber [facility]  10/29/2023 [facility]  Pat ID Date Filled RX # Drug Name Prescriber Name Dispenser Name Qty Days MED PMT Rpt To  1 10/24/2023 [id number] Tramadol Hcl 50MG Mora, Marco [employer] 60 30 10 04 [place]  Date Written New/Refill Dosage Form Prescriber [facility]  10/24/2023 [facility]  Pat ID Date Filled RX # Drug Name Prescriber Name Dispenser Name Qty Days MED PMT Rpt To  1 09/24/2023 [id number] Tramadol Hcl 50MG Mora, Marco [employer] 60 30 10 04 [place]  Date Written New/Refill Dosage Form Prescriber [facility]  07/24/2023 Refill [facility]  Pat ID Date Filled RX # Drug Name Prescriber Name Dispenser Name Qty Days MED PMT Rpt To  1 09/21/2023 [id number] Gabapentin 300MG Croucher, Genesis [employer] 90 30 04 [place]  Date Written New/Refill Dosage Form Prescriber [facility]  09/21/2023 [facility]  Pat ID Date Filled RX # Drug Name Prescriber Name Dispenser Name Qty Days MED PMT Rpt To  1 08/26/2023 [id number] Tramadol Hcl 50MG Mora, Marco [facility] 60 30 10 04 [place]  Date Written New/Refill Dosage Form Prescriber [facility]  07/24/2023 Refill [facility]

## 2024-05-08 ENCOUNTER — TELEPHONE (OUTPATIENT)
Dept: NEUROSURGERY | Facility: CLINIC | Age: 64
End: 2024-05-08
Payer: COMMERCIAL

## 2024-05-08 NOTE — TELEPHONE ENCOUNTER
Provider:  Romie  Surgery/Procedure:  SHALOM  Surgery/Procedure Date:    Last visit:   3/30/23  Next visit: NA     Reason for call:  Patient wants to follow up with Fabi Andino in regards to slight neck pain at night, and a grinding noise in her right ear when she is walking or turning her head - she states she does have history of osteoarthritis. She has no updated imaging. Reports some radiating pain in to her left arm, with some numbness and tingling in the left arm and hands/fingers. Denies any weakness. Is not currently prescribed any medication for pain relief, wants to follow up to discuss refill of gabapentin.

## 2024-05-08 NOTE — TELEPHONE ENCOUNTER
Caller: ORIANA MARLEY    Relationship: SELF    Best call back number: [phone]    What is the best time to reach you: ANYTIME    What was the call regarding: PATIENT CALLED WANTING TO SCHEDULE A TELE-HEALTH APPOINTMENT     PLEASE CALL PATIENT AND ADVISE  THANK YOU

## 2024-05-24 ENCOUNTER — TELEMEDICINE (OUTPATIENT)
Dept: NEUROSURGERY | Facility: CLINIC | Age: 64
End: 2024-05-24
Payer: COMMERCIAL

## 2024-05-24 DIAGNOSIS — M47.22 OSTEOARTHRITIS OF SPINE WITH RADICULOPATHY, CERVICAL REGION: ICD-10-CM

## 2024-05-24 DIAGNOSIS — M50.123 CERVICAL DISC DISORDER AT C6-C7 LEVEL WITH RADICULOPATHY: Primary | ICD-10-CM

## 2024-05-24 DIAGNOSIS — M50.30 DDD (DEGENERATIVE DISC DISEASE), CERVICAL: ICD-10-CM

## 2024-05-24 DIAGNOSIS — M50.20 HNP (HERNIATED NUCLEUS PULPOSUS), CERVICAL: ICD-10-CM

## 2024-05-24 DIAGNOSIS — M47.812 CERVICAL SPONDYLOSIS WITHOUT MYELOPATHY: ICD-10-CM

## 2024-05-24 RX ORDER — GABAPENTIN 300 MG/1
300 CAPSULE ORAL 3 TIMES DAILY
Qty: 90 CAPSULE | Refills: 1 | Status: SHIPPED | OUTPATIENT
Start: 2024-05-24

## 2024-05-24 RX ORDER — TRAMADOL HYDROCHLORIDE 50 MG/1
50 TABLET ORAL 2 TIMES DAILY
Qty: 60 TABLET | Refills: 1 | Status: SHIPPED | OUTPATIENT
Start: 2024-05-24

## 2024-05-24 NOTE — PROGRESS NOTES
Yesica Bronson   1960   [id number]     Due to video technical issues this visit was changed to a telephone visit.  You have chosen to receive care through a telephone visit. Do you consent to use a telephone visit for your medical care today? Yes     CC: Neck and arm pain    HPI:  This is a 64-year-old female with neck and arm pain with diagnostic studies That have revealed degenerative changes at C3-4, C5-6 and C6-7 with bilateral foraminal narrowing.  When she walks she hears a rubbing sound in her right ear. She has neck pain maybe a bit worse since knee surgery. Some numbness in hands which is so much better since PT. She has tingling in the left hand but not much worse than '22.  Left knee replacement in August '23.    Past medical history, allergies, medications, surgical history, social history, family history reviewed and updated.    Social History     Tobacco Use   Smoking Status Never   Smokeless Tobacco Never        There is no height or weight on file to calculate BMI.       Physical examination:  The patient sounds well on the phone, no cough, SOB or wheezing is noted.    Review of new studies: October 2022,  Nerve studies reveal a left chronic C7 radiculopathy.  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    Assessment/Plan:  Diagnoses and all orders for this visit:    1. Cervical disc disorder at C6-C7 level with radiculopathy (Primary)    2. Cervical spondylosis without myelopathy  -     gabapentin (NEURONTIN) 300 MG capsule; Take 1 capsule by mouth 3 (Three) Times a Day. Take 2 capsules in the evening  Dispense: 90 capsule; Refill: 1    3. DDD (degenerative disc disease), cervical  -     gabapentin (NEURONTIN) 300 MG capsule; Take 1 capsule by mouth 3 (Three) Times a Day. Take 2 capsules in the evening  Dispense: 90 capsule; Refill: 1    4. HNP (herniated nucleus pulposus), cervical  -     gabapentin (NEURONTIN) 300 MG capsule; Take 1 capsule by mouth 3 (Three) Times a Day. Take 2 capsules in the evening   Dispense: 90 capsule; Refill: 1    5. Osteoarthritis of spine with radiculopathy, cervical region  -     gabapentin (NEURONTIN) 300 MG capsule; Take 1 capsule by mouth 3 (Three) Times a Day. Take 2 capsules in the evening  Dispense: 90 capsule; Refill: 1      She continues with medications, tramadol as needed and gabapentin 300 mg which does provide her significant improvement in her symptoms.  She does not have weakness in her  or arm, with her last exacerbation she had significant pain and now with improved pain and she does have residual tingling.  Our plan for now will be to continue with her home therapy and medications.    This visit has been rescheduled as a phone visit due to difficulties with video.  Total time of patient care was 30 minutes with reviewing prior documentation, prior diagnostic studies updating new symptoms, medications and electronic documentation.     OZIEL Baig Jerry L, MD

## 2024-08-30 DIAGNOSIS — M50.20 HNP (HERNIATED NUCLEUS PULPOSUS), CERVICAL: ICD-10-CM

## 2024-08-30 DIAGNOSIS — M47.22 OSTEOARTHRITIS OF SPINE WITH RADICULOPATHY, CERVICAL REGION: ICD-10-CM

## 2024-08-30 DIAGNOSIS — M47.812 CERVICAL SPONDYLOSIS WITHOUT MYELOPATHY: ICD-10-CM

## 2024-08-30 DIAGNOSIS — M50.30 DDD (DEGENERATIVE DISC DISEASE), CERVICAL: ICD-10-CM

## 2024-08-30 NOTE — TELEPHONE ENCOUNTER
Provider:  Sina/Santo  Surgery/Procedure:  SHALOM  Surgery/Procedure Date:    Last visit:   5/24/24  Next visit: NA     Reason for call:  Refill request for gabapentin pending.     Gareth:    1 07/30/2024 [id number] Tramadol Hydrochloride 50MG Radhaucher, Genesis [employer] 60.00 30 04 [place]  New 05/24/2024 [facility]  1 06/30/2024 [id number] Tramadol Hydrochloride 50MG Mora, Marco [employer] 60.00 30 04 [place]  Refill 05/01/2024 [facility]  1 06/27/2024 [id number] Gabapentin 300MG Croucher, Genesis [employer] 90.00 30 04 [place]  Refill 05/24/2024 [facility]  1 06/01/2024 [id number] Tramadol Hydrochloride 50MG Mora, Marco [employer] 60.00 30 04 [place]  Refill 05/01/2024 [place] [id number] 05/24/2024 [id number] Gabapentin 300MG Croucher, Genesis [employer] [employer] 90.00 30 04 [place]

## 2024-09-03 RX ORDER — GABAPENTIN 300 MG/1
300 CAPSULE ORAL 3 TIMES DAILY
Qty: 90 CAPSULE | Refills: 1 | Status: SHIPPED | OUTPATIENT
Start: 2024-09-03 | End: 2024-09-04 | Stop reason: DRUGHIGH

## 2024-09-04 DIAGNOSIS — M47.812 CERVICAL SPONDYLOSIS WITHOUT MYELOPATHY: Primary | ICD-10-CM

## 2024-09-04 RX ORDER — GABAPENTIN 300 MG/1
300 CAPSULE ORAL 3 TIMES DAILY
Qty: 90 CAPSULE | Refills: 1 | Status: CANCELLED | OUTPATIENT
Start: 2024-09-04

## 2024-09-04 RX ORDER — GABAPENTIN 300 MG/1
300 CAPSULE ORAL 3 TIMES DAILY
Qty: 120 CAPSULE | Refills: 1 | Status: SHIPPED | OUTPATIENT
Start: 2024-09-04

## 2025-02-18 DIAGNOSIS — M47.812 CERVICAL SPONDYLOSIS WITHOUT MYELOPATHY: ICD-10-CM

## 2025-02-18 RX ORDER — GABAPENTIN 300 MG/1
CAPSULE ORAL
Qty: 120 CAPSULE | OUTPATIENT
Start: 2025-02-18

## 2025-03-20 ENCOUNTER — TELEPHONE (OUTPATIENT)
Dept: NEUROSURGERY | Facility: CLINIC | Age: 65
End: 2025-03-20
Payer: COMMERCIAL

## 2025-03-20 DIAGNOSIS — M47.812 CERVICAL SPONDYLOSIS WITHOUT MYELOPATHY: ICD-10-CM

## 2025-03-20 NOTE — TELEPHONE ENCOUNTER
Provider:  Sina  Surgery/Procedure:  SHALOM  Surgery/Procedure Date:    Last visit:   5/24/24  Next visit: NA     Reason for call:  Refill request for gabapentin pending.     Gareth:    1 02/28/2025 [id number] Tramadol Hydrochloride 50MG Bargo, Mandy [facility] 60.00 30 04 [place]  Refill 12/30/2024 [facility]  1 01/29/2025 [id number] Tramadol Hydrochloride 50MG Bargo, Mandy [facility] 60.00 30 04 [place]  Refill 12/30/2024 [facility]  1 12/30/2024 [id number] Tramadol Hydrochloride 50MG Bargo, Mandy [facility] 60.00 30 04 [place]  New 12/30/2024 [facility]  1 11/26/2024 [id number] Tramadol Hydrochloride 50MG Mora, Marco [facility] 60.00 30 04 [place]  Refill 09/26/2024 [facility]  1 10/25/2024 [id number] Tramadol Hydrochloride 50MG Mora, Marco [facility] 60.00 30 04 [place]  New 09/26/2024 [facility]  1 10/16/2024 [id number] Gabapentin 300MG Genesis Andino [facility] 120.00 30 04 [place]  Refill 09/04/2024 [facility]

## 2025-03-20 NOTE — TELEPHONE ENCOUNTER
Caller: ORIANA MARLEY    Relationship: SELF    Best call back number: [phone]    Requested Prescriptions:   Requested Prescriptions     Pending Prescriptions Disp Refills    gabapentin (NEURONTIN) 300 MG capsule 120 capsule 1     Sig: Take 1 capsule by mouth 3 (Three) Times a Day. For the 3rd dose the patient will take 2 capsules.        [facility]    Last office visit with prescribing clinician: Visit date not found   Last telemedicine visit with prescribing clinician: Visit date not found   Next office visit with prescribing clinician: Visit date not found     Additional details provided by patient: PATIENT CALLED REQUESTING A REFILL.  PATIENT STATES IF SHE NEEDS AN APPT TO OBTAIN A REFILL SHE WOULD FOR THE APPT TO BE A TELEPHONE VISIT    Does the patient have less than a 3 day supply:  [x] Yes  [] No    Would you like a call back once the refill request has been completed: [x] Yes [] No    If the office needs to give you a call back, can they leave a voicemail: [x] Yes [] No    Carlos Juárez Rep   03/20/25 11:57 EDT

## 2025-03-21 ENCOUNTER — TELEMEDICINE (OUTPATIENT)
Dept: NEUROSURGERY | Facility: CLINIC | Age: 65
End: 2025-03-21
Payer: COMMERCIAL

## 2025-03-21 DIAGNOSIS — M50.123 CERVICAL DISC DISORDER AT C6-C7 LEVEL WITH RADICULOPATHY: Primary | ICD-10-CM

## 2025-03-21 DIAGNOSIS — M47.812 CERVICAL SPONDYLOSIS WITHOUT MYELOPATHY: ICD-10-CM

## 2025-03-21 DIAGNOSIS — M50.30 DDD (DEGENERATIVE DISC DISEASE), CERVICAL: ICD-10-CM

## 2025-03-21 PROCEDURE — 99213 OFFICE O/P EST LOW 20 MIN: CPT | Performed by: PHYSICIAN ASSISTANT

## 2025-03-21 RX ORDER — LOSARTAN POTASSIUM 25 MG/1
25 TABLET ORAL DAILY
COMMUNITY

## 2025-03-21 RX ORDER — CYCLOBENZAPRINE HCL 5 MG
TABLET ORAL
COMMUNITY

## 2025-03-21 RX ORDER — MONTELUKAST SODIUM 10 MG/1
1 TABLET ORAL DAILY
COMMUNITY

## 2025-03-21 RX ORDER — GABAPENTIN 300 MG/1
300 CAPSULE ORAL 3 TIMES DAILY
Qty: 120 CAPSULE | Refills: 1 | Status: SHIPPED | OUTPATIENT
Start: 2025-03-21

## 2025-03-21 RX ORDER — OMEPRAZOLE 20 MG/1
CAPSULE, DELAYED RELEASE ORAL
COMMUNITY
Start: 2024-05-01

## 2025-03-21 RX ORDER — ONDANSETRON 4 MG/1
4 TABLET, ORALLY DISINTEGRATING ORAL EVERY 6 HOURS PRN
COMMUNITY
Start: 2024-12-30

## 2025-03-21 NOTE — PROGRESS NOTES
Yesica Bronson   1960   [id number]       03/21/2025     Chief Complaint   Patient presents with   • Neck Pain     Cervical spondylosis without myelopathy     • Arm Pain     Left arm   • Numbness     LT arm/hand        Neck Pain   Associated symptoms include numbness. Pertinent negatives include no chest pain, fever, headaches, photophobia, trouble swallowing or weakness.   Arm Pain   Associated symptoms include numbness. Pertinent negatives include no chest pain.    ***  Chronic Illnesses:  Past Medical History:  No date: Anemia  No date: Arthritis  No date: Asthma  No date: Osteopetrosis  No date: Parathyroid adenoma  No date: Parathyroid disease     Past Surgical History:   Procedure Laterality Date   • HYSTERECTOMY  2000   • PARATHYROID GLAND SURGERY  2001    Partial removal    • REPLACEMENT TOTAL KNEE      Left        No Known Allergies       Current Outpatient Medications:   •  omeprazole (priLOSEC) 20 MG capsule, omeprazole 20 mg capsule,delayed release  TAKE 1 CAPSULE BY MOUTH DAILY, Disp: , Rfl:   •  ondansetron ODT (ZOFRAN-ODT) 4 MG disintegrating tablet, Take 1 tablet by mouth Every 6 (Six) Hours As Needed for Nausea or Vomiting. DISSOLVE ON TONGUE, Disp: , Rfl:   •  Cholecalciferol (Vitamin D3) 1.25 MG (19059 UT) capsule, Take 1 capsule by mouth 1 (One) Time Per Week., Disp: , Rfl:   •  cyclobenzaprine (FLEXERIL) 5 MG tablet, TAKE 1 TABLET BY MOUTH EVERY NIGHT AT BEDTIME AS NEEDED FOR MUSCLE SPASM, Disp: , Rfl:   •  gabapentin (NEURONTIN) 300 MG capsule, Take 1 capsule by mouth 3 (Three) Times a Day. For the 3rd dose the patient will take 2 capsules., Disp: 120 capsule, Rfl: 1  •  losartan (COZAAR) 25 MG tablet, Take 1 tablet by mouth Daily., Disp: , Rfl:   •  meloxicam (MOBIC) 15 MG tablet, Take 1 tablet by mouth Daily As Needed. for pain, Disp: , Rfl:   •  montelukast (Singulair) 10 MG tablet, Take 1 tablet by mouth Daily., Disp: , Rfl:   •  ProAir  (90 Base) MCG/ACT inhaler, As Needed., Disp:  , Rfl:   •  traMADol (ULTRAM) 50 MG tablet, Take 1 tablet by mouth 2 (Two) Times a Day. for pain, Disp: 60 tablet, Rfl: 1     Social History     Socioeconomic History   • Marital status: Single   Tobacco Use   • Smoking status: Never   • Smokeless tobacco: Never   Vaping Use   • Vaping status: Never Used   Substance and Sexual Activity   • Alcohol use: Never   • Drug use: Never   • Sexual activity: Defer        family history includes Arthritis in her brother and sister; Heart disease in her mother; Lupus in her sister; Rheum arthritis in her sister.     Review of Systems   Constitutional:  Negative for activity change, appetite change, chills, diaphoresis, fatigue, fever and unexpected weight change.   HENT:  Negative for congestion, dental problem, drooling, ear discharge, ear pain, facial swelling, hearing loss, mouth sores, nosebleeds, postnasal drip, rhinorrhea, sinus pressure, sinus pain, sneezing, sore throat, tinnitus, trouble swallowing and voice change.    Eyes:  Negative for photophobia, pain, discharge, redness, itching and visual disturbance.   Respiratory:  Negative for apnea, cough, choking, chest tightness, shortness of breath, wheezing and stridor.    Cardiovascular:  Negative for chest pain, palpitations and leg swelling.   Gastrointestinal:  Negative for abdominal distention, abdominal pain, anal bleeding, blood in stool, constipation, diarrhea, nausea, rectal pain and vomiting.   Endocrine: Negative for cold intolerance, heat intolerance, polydipsia, polyphagia and polyuria.   Genitourinary:  Negative for decreased urine volume, difficulty urinating, dysuria, enuresis, flank pain, frequency, genital sores, hematuria, menstrual problem, pelvic pain, urgency, vaginal bleeding, vaginal discharge and vaginal pain.   Musculoskeletal:  Positive for neck pain. Negative for arthralgias, back pain, gait problem, joint swelling, myalgias and neck stiffness.   Skin:  Negative for color change, pallor, rash  and wound.   Allergic/Immunologic: Negative for environmental allergies, food allergies and immunocompromised state.   Neurological:  Positive for numbness. Negative for dizziness, tremors, seizures, syncope, facial asymmetry, speech difficulty, weakness, light-headedness and headaches.   Hematological:  Negative for adenopathy. Does not bruise/bleed easily.   Psychiatric/Behavioral:  Negative for agitation, behavioral problems, confusion, decreased concentration, dysphoric mood, hallucinations, self-injury, sleep disturbance and suicidal ideas. The patient is not nervous/anxious and is not hyperactive.    All other systems reviewed and are negative.       Gait & Balance Assessment :  Risk assessment for falls. Fall precautions.  universal fall precautions, such as;   Using gait aids a cane, walker at the appropriate height at all times for ambulation or if necessary a wheelchair  Removing all area rugs and coffee tables to create a safe environment at home  Ensure clean, dry floors  Wearing supportive footwear and properly fitting clothing  Ensure bed/chair is appropriate height and patient's feet can touch the floor  Using a shower transfer bench  Using walk-in shower and having shower safety bars installed  Ensure proper lighting, minimize glare  Have nightlights operational and in use  Participation in an exercise program for gait training, balance training and strength  Avoid carrying laundry up and down steps  Ensure proper compliance and organization of medications to avoid errors   Avoid use of over the counter sedatives and alcohol consumption  Ensure easy access to call bell, glasses, TV control, telephone  Ensure glasses/hearing aids are in use or close by (on top of night table)     Social History    Tobacco Use      Smoking status: Never      Smokeless tobacco: Never      There is no height or weight on file to calculate BMI.   {Class 2 Severe Obesity (BMI >=35 and <=39.9.:1326135807}       Physical  Examination:  There were no vitals taken for this visit.   HEENT-wnl  Lungs-No wheezing or SOB  Patient is awake, alert and oriented.  Pupils 3 mm, equal and reactive.  Visual fields are full.  EOMI without nystagmus.  Normal facial sensation to light touch in all 3 distributions of CN V bilaterally.  Face symmetric.  Tongue midline.  5/5 in the extremities.  Sensation intact.  Reflexes intact.  Gait ***  SLR***  Hip rotation ***    Radiological Data Review:  All neurological imaging studies were independently reviewed unless otherwise documented.    Assessment and Plan:  There are no diagnoses linked to this encounter.     Including assessment, review of prior documentation, review and interpretation of new and old diagnostic studies, discussing these findings with the patient and documentation, 30 minutes total time was spent on this appointment.    Any copied data from previous notes included in the (1) HPI, (2) PE, (3) MDM and/or assessment and plan has been reviewed and is accurate as of this date.    Genesis Andino, PAC    PCP:  Berto Duron MD

## 2025-03-21 NOTE — PROGRESS NOTES
Yesica Bronson   1960   [id number]     03/21/2025     Telemedicine: OZIEL Cervantes  Location of Provider: Office    Patient presents today for telehealth services.  The service was conducted via audio/visual technology through [employer].  I am located in my [place] neurosurgery office.  Patient is being seen remotely via telehealth at their home, [address] [address], they are in a secure environment for this session.  The patient's condition follow-up is appropriate for telemedicine.    You have chosen to receive care through a telephone visit. Do you consent to use a telephone visit for your medical care today? 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 I have seen her  in 2021 and 2022 with neck and arm pain that she has managed with conservative treatments and medications. In new f/u she continues to have pain and numbness as well as mild weakness.    Past medical history, allergies, medications, surgical history, social history, family history reviewed and updated.    Social History     Tobacco Use   Smoking Status Never   Smokeless Tobacco Never        There is no height or weight on file to calculate BMI.     Fall Risk Assessment was completed, and patient is at low risk for falls.     Physical examination:  Patient looks well today.  Awake, alert, conversant, answers questions appropriately.    Review of prior studies reveals cervical spondylosis.     Asessment/Plan:  Diagnoses and all orders for this visit:    1. Cervical disc disorder at C6-C7 level with radiculopathy (Primary)  -     MRI Cervical Spine Without Contrast; Future  -     EMG & Nerve Conduction Test; Future    2. DDD (degenerative disc disease), cervical  -     MRI Cervical Spine Without Contrast; Future  -     EMG & Nerve Conduction Test; Future    3. Cervical spondylosis without myelopathy  -     MRI Cervical Spine Without Contrast; Future  -     EMG & Nerve Conduction Test;  Future       We have discussed new studies and Yesica is in agreement. I have renewed her meds. New studies are ordered, she will see me in the office with new studies including nerve studies.    I spent  more than 30 minutes caring for Yesica Bronson on 03/23/25.  This time includes activities  reviewing past notes and any current outside notes, reviewing test, reviewing diagnostic studies, making my interpretation and explaining these results with the patient, discussing plans of treatment, care coordination, and documenting information in the medical records.    Any copied data from previous notes included in the (1) HPI, (2) PE, (3) MDM and/or assessment and plan has been reviewed and is accurate as of this date.    OZIEL Baig Jerry L, MD